# Patient Record
Sex: MALE | Race: WHITE | Employment: UNEMPLOYED | URBAN - METROPOLITAN AREA
[De-identification: names, ages, dates, MRNs, and addresses within clinical notes are randomized per-mention and may not be internally consistent; named-entity substitution may affect disease eponyms.]

---

## 2018-03-08 ENCOUNTER — HOSPITAL ENCOUNTER (OUTPATIENT)
Dept: GENERAL RADIOLOGY | Age: 58
Discharge: HOME | End: 2018-03-08
Attending: PHYSICIAN ASSISTANT
Payer: MEDICARE

## 2018-03-08 DIAGNOSIS — M54.2 CERVICALGIA: ICD-10-CM

## 2018-03-08 DIAGNOSIS — M47.816 LUMBAR SPONDYLOSIS: ICD-10-CM

## 2018-03-08 DIAGNOSIS — M51.17 SCIATIC RADICULITIS: ICD-10-CM

## 2018-03-08 PROCEDURE — 72052 X-RAY EXAM NECK SPINE 6/>VWS: CPT

## 2018-03-08 PROCEDURE — 72114 X-RAY EXAM L-S SPINE BENDING: CPT

## 2020-03-11 ENCOUNTER — OFFICE VISIT (OUTPATIENT)
Dept: NEUROLOGY | Age: 60
End: 2020-03-11

## 2020-03-11 VITALS
HEART RATE: 80 BPM | WEIGHT: 214 LBS | OXYGEN SATURATION: 98 % | HEIGHT: 68 IN | DIASTOLIC BLOOD PRESSURE: 82 MMHG | SYSTOLIC BLOOD PRESSURE: 130 MMHG | BODY MASS INDEX: 32.43 KG/M2

## 2020-03-11 DIAGNOSIS — H53.9 VISUAL DISTURBANCE: ICD-10-CM

## 2020-03-11 DIAGNOSIS — R20.2 PARESTHESIA: Primary | ICD-10-CM

## 2020-03-11 RX ORDER — GABAPENTIN 600 MG/1
600 TABLET ORAL
COMMUNITY

## 2020-03-11 RX ORDER — LOSARTAN POTASSIUM 50 MG/1
50 TABLET ORAL DAILY
COMMUNITY

## 2020-03-11 RX ORDER — HYDROCHLOROTHIAZIDE 25 MG/1
25 TABLET ORAL DAILY
COMMUNITY

## 2020-03-11 RX ORDER — RANITIDINE 300 MG/1
300 TABLET ORAL
Status: ON HOLD | COMMUNITY
End: 2020-09-29

## 2020-03-11 RX ORDER — PANTOPRAZOLE SODIUM 40 MG/1
40 TABLET, DELAYED RELEASE ORAL DAILY
Status: ON HOLD | COMMUNITY
End: 2020-09-29

## 2020-03-11 RX ORDER — ATORVASTATIN CALCIUM 20 MG/1
TABLET, FILM COATED ORAL DAILY
COMMUNITY

## 2020-03-11 NOTE — PROGRESS NOTES
Chief Complaint   Patient presents with    Leg Pain     \"leg pain and restlessness intermittently for years, numbness that is worsening in my arms\"     Visit Vitals  /82 (BP 1 Location: Right arm, BP Patient Position: Sitting)   Pulse 80   Ht 5' 8\" (1.727 m)   Wt 97.1 kg (214 lb)   SpO2 98%   BMI 32.54 kg/m²

## 2020-03-11 NOTE — PROGRESS NOTES
NEUROLOGY NEW PATIENT OFFICE CONSULTATION      3/11/2020    RE: Nathalie Astudillo         1960      REFERRED BY:  Meliton Dinero PA-C        CHIEF COMPLAINT:  This is Nathalie Astudillo is a 61 y.o. male right handed on disability for pain who had concerns including Leg Pain (\"leg pain and restlessness intermittently for years, numbness that is worsening in my arms\"). HPI:     For the past 20 yrs, patient will have episodes of visual disturbance described as \"squiggly lines\" at the periphery of both eyes, occurring 2/ month, lasting 2-3 mins. (+) glaucoma and had to \"drill a hole in the eyes\" 5 yrs ago. Numbness in both arms involving 3rd to 5th fingers, R worse than L   (+) chronic neck pain - non-radiating. Chest tightness. (+) insomnia - due to chronic pain    EMG/NCS at [facility] 3 yrs ago unknown results. ROS   (-) fever  (-) rash  All other systems reviewed and are negative    Past Medical Hx  Past Medical History:   Diagnosis Date    Acid reflux     Arthritis     Chronic pain     Depression     High cholesterol     Hypertension     Sleep disorder     Stroke ([facility] [address] 2010    ministroke   R knee pain  Hip pain  Chronic neck and lower back pain - seeing pain management s/p rhizotomy    Social Hx  Social History     Socioeconomic History    Marital status: UNKNOWN     Spouse name: Not on file    Number of children: Not on file    Years of education: Not on file    Highest education level: Not on file   Tobacco Use    Smoking status: Never Smoker    Smokeless tobacco: Never Used       Family Hx  No family history on file. ALLERGIES  Allergies   Allergen Reactions    Nsaids (Non-Steroidal Anti-Inflammatory Drug) Other (comments)       CURRENT MEDS  Current Outpatient Medications   Medication Sig Dispense Refill    gabapentin (NEURONTIN) 600 mg tablet Take 600 mg by mouth five (5) times daily.  losartan (COZAAR) 50 mg tablet Take 50 mg by mouth daily.       raNITIdine (ZANTAC) 300 mg tab Take 300 mg by mouth nightly.  atorvastatin (LIPITOR) 20 mg tablet Take  by mouth daily.  hydroCHLOROthiazide (HYDRODIURIL) 25 mg tablet Take 25 mg by mouth daily.  pantoprazole (PROTONIX) 40 mg tablet Take 40 mg by mouth daily.  hydrocodone-acetaminophen (NORCO) 5-325 mg per tablet Take 1 tablet by mouth every eight (8) hours as needed for Pain.  multivitamin (ONE A DAY) tablet Take 1 tablet by mouth daily.  trazodone (DESYREL) 100 mg tablet Take 100 mg by mouth nightly.  rosuvastatin (CRESTOR) 10 mg tablet Take 10 mg by mouth nightly.  tramadol (ULTRAM) 50 mg tablet Take 50 mg by mouth two (2) times daily as needed for Pain.  aspirin 81 mg chewable tablet Take 81 mg by mouth daily.  diclofenac-misoprostol (ARTHROTEC 75)  mg-mcg per tablet Take 1 tablet by mouth two (2) times a day.  candesartan (ATACAND) 16 mg tablet Take 16 mg by mouth daily.  esomeprazole (NEXIUM) 40 mg capsule Take 40 mg by mouth daily.  prednisone (STERAPRED DS) 10 mg dose pack Take as directed. May substitute 21 pills with tapering dose instructions. 21 tablet 0    cyclobenzaprine (FLEXERIL) 10 mg tablet Take 1 tablet by mouth three (3) times daily as needed for Muscle Spasm(s). 20 tablet 0           PREVIOUS WORKUP: (reviewed)  IMAGING:    CT Results (recent):  Results from Hospital Encounter encounter on 10/10/14   CT SPINE CERV WO CONT    Narrative **Final Report**       ICD Codes / Adm. Diagnosis: 225579  491294 / Shoulder Pain  Hypertension  Examination:  CT C SPINE WO CON  - [id number] - Oct 10 2014  5:52PM  Accession No:  [id number]  Reason:  trauma      REPORT:  INDICATION: trauma    Exam: Noncontrast CT of the cervical spine is performed with 2.5 mm   collimation. Sagittal and coronal reformatted images were also performed. FINDINGS: There is no acute fracture or subluxation. The prevertebral soft   tissues are within normal limits.  Bones are diffusely demineralized. There   are mild multilevel cervical degenerative changes. Remaining visualized soft   tissues are normal.        IMPRESSION: No acute fracture or subluxation. Signing/Reading Doctor: TODD B. Christen Kayser ([id number])    Approved: TODD B. Christen Kayser ([id number])  Oct 10 2014  6:16PM                                      MRI Results (recent):  No results found for this or any previous visit. IR Results (recent):  No results found for this or any previous visit. VAS/US Results (recent):  No results found for this or any previous visit. LABS (reviewed)  Results for orders placed or performed during the hospital encounter of 19/58/84   METABOLIC PANEL, BASIC   Result Value Ref Range    Sodium 141 136 - 145 mmol/L    Potassium 4.5 3.5 - 5.1 mmol/L    Chloride 105 97 - 108 mmol/L    CO2 27 21 - 32 mmol/L    Anion gap 9 5 - 15 mmol/L    Glucose 86 65 - 100 mg/dL    BUN 21 (H) 6 - 20 MG/DL    Creatinine 1.03 0.45 - 1.15 MG/DL    BUN/Creatinine ratio 20 12 - 20      GFR est AA >60 >60 ml/min/1.73m2    GFR est non-AA >60 >60 ml/min/1.73m2    Calcium 9.6 8.5 - 10.1 MG/DL   CBC WITH AUTOMATED DIFF   Result Value Ref Range    WBC 11.5 (H) 4.1 - 11.1 K/uL    RBC 4.86 4.10 - 5.70 M/uL    HGB 15.5 12.1 - 17.0 g/dL    HCT 43.8 36.6 - 50.3 %    MCV 90.1 80.0 - 99.0 FL    MCH 31.9 26.0 - 34.0 PG    MCHC 35.4 30.0 - 36.5 g/dL    RDW 13.7 11.5 - 14.5 %    PLATELET 088 428 - 304 K/uL    NEUTROPHILS 75 32 - 75 %    LYMPHOCYTES 18 12 - 49 %    MONOCYTES 6 5 - 13 %    EOSINOPHILS 1 0 - 7 %    BASOPHILS 0 0 - 1 %    ABS. NEUTROPHILS 8.7 (H) 1.8 - 8.0 K/UL    ABS. LYMPHOCYTES 2.0 0.8 - 3.5 K/UL    ABS. MONOCYTES 0.7 0.0 - 1.0 K/UL    ABS. EOSINOPHILS 0.1 0.0 - 0.4 K/UL    ABS.  BASOPHILS 0.0 0.0 - 0.1 K/UL   POC TROPONIN-I   Result Value Ref Range    Troponin-I (POC) <0.04 0.00 - 0.08 ng/mL   EKG, 12 LEAD, INITIAL   Result Value Ref Range    Ventricular Rate 88 BPM    Atrial Rate 88 BPM    P-R Interval 178 ms    QRS Duration 92 ms    Q-T Interval 352 ms    QTC Calculation (Bezet) 425 ms    Calculated P Axis 25 degrees    Calculated R Axis -17 degrees    Calculated T Axis 3 degrees    Diagnosis       Normal sinus rhythm with sinus arrhythmia  Voltage criteria for left ventricular hypertrophy  Abnormal ECG  No previous ECGs available  Confirmed by Chan Rome MD ([id number]) on 10/11/2014 9:14:57 PM       Physical Exam:     Visit Vitals  /82 (BP 1 Location: Right arm, BP Patient Position: Sitting)   Pulse 80   Ht 5' 8\" (1.727 m)   Wt 97.1 kg (214 lb)   SpO2 98%   BMI 32.54 kg/m²     General:  Alert, cooperative, no distress. Head:  Normocephalic, without obvious abnormality, atraumatic. Eyes:  Conjunctivae/corneas clear. Lungs:  Heart:   Non labored breathing  Regular rate and rhythm, no carotid bruits   Abdomen:   Soft, non-distended   Extremities: Extremities normal, atraumatic, no cyanosis or edema. Pulses: 2+ and symmetric all extremities. Skin: Skin color, texture, turgor normal. No rashes or lesions.   Neurologic Exam     Gen: Attention normal             Language: naming, repetition, fluency normal             Memory: intact recent and remote memory  Cranial Nerves:  I: smell Not tested   II: visual fields Full to confrontation   II: pupils Equal, round, reactive to light   II: optic disc No papilledema   III,VII: ptosis none   III,IV,VI: extraocular muscles  Full ROM   V: mastication normal   V: facial light touch sensation  normal   VII: facial muscle function   symmetric   VIII: hearing symmetric   IX: soft palate elevation  normal   XI: trapezius strength  5/5   XI: sternocleidomastoid strength 5/5   XI: neck flexion strength  5/5   XII: tongue  midline     Motor: normal bulk and tone, no tremor              Strength: 5/5 all four extremities  Sensory: intact to LT, PP, vibration, and JPS  Reflexes: 1+ throughout; Down going toes  Coordination: Good FTN and HTS  Gait: normal gait including tandem Impression:     Zulay Mix is a 61 y.o. male who  has a past medical history of Acid reflux, Arthritis, Chronic pain, Depression, High cholesterol, Hypertension, Sleep disorder, and Stroke ([facility] [address] (2010). who comes in with several concerns. For the past 20 yrs, patient will have episodes of visual disturbance described as \"squiggly lines\" at the periphery of both eyes, occurring 2/ month, lasting 2-3 mins. Considerations include retinal migraine and related to history of glaucoma (had to \"drill a hole in the eyes\" 5 yrs ago). Patient also has intermittent numbness in both arms involving 3rd to 5th fingers, R worse than L usually while he is sleeping. Differentials include carpal tunnel syndrome and cervical radiculopathy (chronic neck pain - non-radiating with history of degenerative disc disease). His insomnia due to chronic neck pain may be playing a role with his symptoms,    RECOMMENDATIONS  1. I had a long discussion with patient. Discussed diagnosis, prognosis, pathophysiology and available treatment. All questions were answered. 2. Offered doing MRI brain. Patient declined for now due to financial constraints. He did have a CT head in 2014 which was unremarkable  3. Discussed doing EMG/NCS of both UE. Patient also deferred for now  4. Advise to use bilateral wrist splint at night to see if this will help with paresthesia and thereby supporting the diagnosis of CTS  5. Advise to follow up with PCP if he continues to have chest pain  6. Recommend improving sleep hygiene  7. Already seeing pain management for chronic pain issues      Follow-up and Dispositions    · Return if symptoms worsen or fail to improve.             Thank you for the consultation      Dilip Romero MD  Diplomate, American Board of Psychiatry and Neurology  Diplomate, Neuromuscular Medicine  Diplomate, American Board of Electrodiagnostic Medicine        CC: Kelli Meraz  Fax: [phone]

## 2020-03-11 NOTE — LETTER
3/11/20 Patient: Brionna Mays YOB: 1960 Date of Visit: 3/11/2020 Caesar Rascon PA-C 
[address] [place] [address] VIA Facsimile: [phone] Dear Caesar Rascon PA-C, Thank you for referring Mr. Idania Lewis to [address] [place] [address] for evaluation. My notes for this consultation are attached. If you have questions, please do not hesitate to call me. I look forward to following your patient along with you. Sincerely, Anitha Ragsdale MD

## 2020-09-09 ENCOUNTER — HOSPITAL ENCOUNTER (OUTPATIENT)
Dept: MRI IMAGING | Age: 60
Discharge: HOME | End: 2020-09-09
Attending: PHYSICAL MEDICINE & REHABILITATION
Payer: MEDICARE

## 2020-09-09 DIAGNOSIS — M54.50 LOW BACK PAIN: ICD-10-CM

## 2020-09-09 DIAGNOSIS — M47.816 SPONDYLOSIS OF LUMBAR JOINT: ICD-10-CM

## 2020-09-09 DIAGNOSIS — Z79.891 CHRONICALLY ON OPIATE THERAPY: ICD-10-CM

## 2020-09-09 PROCEDURE — 72148 MRI LUMBAR SPINE W/O DYE: CPT

## 2020-09-25 ENCOUNTER — HOSPITAL ENCOUNTER (OUTPATIENT)
Dept: LAB | Age: 60
Discharge: HOME | End: 2020-09-25
Payer: MEDICARE

## 2020-09-25 DIAGNOSIS — Z01.812 PRE-PROCEDURAL LABORATORY EXAMINATIONS: ICD-10-CM

## 2020-09-25 PROCEDURE — 87635 SARS-COV-2 COVID-19 AMP PRB: CPT

## 2020-09-25 RX ORDER — FAMOTIDINE 40 MG/1
40 TABLET, FILM COATED ORAL DAILY
COMMUNITY

## 2020-09-25 RX ORDER — OXYCODONE AND ACETAMINOPHEN 10; 325 MG/1; MG/1
1 TABLET ORAL
COMMUNITY

## 2020-09-25 NOTE — PERIOP NOTES
[address] [place] [address] Dr Baxter Preprocedure Instructions      1. On the day of your surgery, please report to registration located on the 2nd floor of the  [facility]. yes    2. You must have a responsible adult to drive you to the hospital, stay at the hospital during your procedure and drive you home. If they leave your procedure will not be started (no exceptions). yes    3. Do not have anything to eat or drink (including water, gum, mints, coffee, and juice) after midnight. This does not apply to the medications you were instructed to take by your physician. yesIf you are currently taking Plavix, Coumadin, Aspirin, or other blood-thinning agents, contact your physician for special instructions. Yes- aspirin. 4. If you are having a procedure that requires bowel prep: We recommend that you have only clear liquids the day before your procedure and begin your bowel prep by 5:00 pm.  You may continue to drink clear liquids until midnight. If for any reason you are not able to complete your prep please notify your physician immediately. yes    5. Have a list of all current medications, including vitamins, herbal supplements and any other over the counter medications. yes    6. If you wear glasses, contacts, dentures and/or hearing aids, they may be removed prior to procedure, please bring a case to store them in. yes    7. You should understand that if you do not follow these instructions your procedure may be cancelled. If your physical condition changes (I.e. fever, cold or flu) please contact your doctor as soon as possible. 8. It is important that you be on time. If for any reason you are unable to keep your appointment please call [phone] the day of or your physicians office prior to your scheduled procedure.

## 2020-09-26 LAB — SARS-COV-2, COV2NT: NOT DETECTED

## 2020-09-29 ENCOUNTER — ANESTHESIA (OUTPATIENT)
Dept: ENDOSCOPY | Age: 60
End: 2020-09-29
Payer: MEDICARE

## 2020-09-29 ENCOUNTER — HOSPITAL ENCOUNTER (OUTPATIENT)
Age: 60
Setting detail: OUTPATIENT SURGERY
Discharge: HOME | End: 2020-09-29
Attending: INTERNAL MEDICINE | Admitting: INTERNAL MEDICINE
Payer: MEDICARE

## 2020-09-29 ENCOUNTER — ANESTHESIA EVENT (OUTPATIENT)
Dept: ENDOSCOPY | Age: 60
End: 2020-09-29
Payer: MEDICARE

## 2020-09-29 VITALS
HEART RATE: 86 BPM | RESPIRATION RATE: 16 BRPM | TEMPERATURE: 98.6 F | SYSTOLIC BLOOD PRESSURE: 128 MMHG | OXYGEN SATURATION: 94 % | BODY MASS INDEX: 32.61 KG/M2 | HEIGHT: 68 IN | DIASTOLIC BLOOD PRESSURE: 97 MMHG | WEIGHT: 215.17 LBS

## 2020-09-29 PROCEDURE — 76040000007: Performed by: INTERNAL MEDICINE

## 2020-09-29 PROCEDURE — 77030003657 HC NDL SCLER BSC -B: Performed by: INTERNAL MEDICINE

## 2020-09-29 PROCEDURE — 74011000250 HC RX REV CODE- 250: Performed by: NURSE ANESTHETIST, CERTIFIED REGISTERED

## 2020-09-29 PROCEDURE — 2709999900 HC NON-CHARGEABLE SUPPLY: Performed by: INTERNAL MEDICINE

## 2020-09-29 PROCEDURE — 74011250636 HC RX REV CODE- 250/636: Performed by: NURSE ANESTHETIST, CERTIFIED REGISTERED

## 2020-09-29 PROCEDURE — 77030021593 HC FCPS BIOP ENDOSC BSC -A: Performed by: INTERNAL MEDICINE

## 2020-09-29 PROCEDURE — 74011250636 HC RX REV CODE- 250/636: Performed by: INTERNAL MEDICINE

## 2020-09-29 PROCEDURE — 76060000032 HC ANESTHESIA 0.5 TO 1 HR: Performed by: INTERNAL MEDICINE

## 2020-09-29 PROCEDURE — 88305 TISSUE EXAM BY PATHOLOGIST: CPT

## 2020-09-29 RX ORDER — DEXTROMETHORPHAN/PSEUDOEPHED 2.5-7.5/.8
1.2 DROPS ORAL
Status: DISCONTINUED | OUTPATIENT
Start: 2020-09-29 | End: 2020-09-29 | Stop reason: HOSPADM

## 2020-09-29 RX ORDER — PROPOFOL 10 MG/ML
INJECTION, EMULSION INTRAVENOUS AS NEEDED
Status: DISCONTINUED | OUTPATIENT
Start: 2020-09-29 | End: 2020-09-29 | Stop reason: HOSPADM

## 2020-09-29 RX ORDER — SODIUM CHLORIDE 9 MG/ML
50 INJECTION, SOLUTION INTRAVENOUS CONTINUOUS
Status: DISCONTINUED | OUTPATIENT
Start: 2020-09-29 | End: 2020-09-29 | Stop reason: HOSPADM

## 2020-09-29 RX ORDER — LABETALOL HYDROCHLORIDE 5 MG/ML
INJECTION, SOLUTION INTRAVENOUS AS NEEDED
Status: DISCONTINUED | OUTPATIENT
Start: 2020-09-29 | End: 2020-09-29 | Stop reason: HOSPADM

## 2020-09-29 RX ORDER — NALOXONE HYDROCHLORIDE 0.4 MG/ML
0.4 INJECTION, SOLUTION INTRAMUSCULAR; INTRAVENOUS; SUBCUTANEOUS
Status: DISCONTINUED | OUTPATIENT
Start: 2020-09-29 | End: 2020-09-29 | Stop reason: HOSPADM

## 2020-09-29 RX ORDER — LIDOCAINE HYDROCHLORIDE 20 MG/ML
INJECTION, SOLUTION EPIDURAL; INFILTRATION; INTRACAUDAL; PERINEURAL AS NEEDED
Status: DISCONTINUED | OUTPATIENT
Start: 2020-09-29 | End: 2020-09-29 | Stop reason: HOSPADM

## 2020-09-29 RX ORDER — ATROPINE SULFATE 0.1 MG/ML
0.4 INJECTION INTRAVENOUS
Status: DISCONTINUED | OUTPATIENT
Start: 2020-09-29 | End: 2020-09-29 | Stop reason: HOSPADM

## 2020-09-29 RX ORDER — EPINEPHRINE 0.1 MG/ML
1 INJECTION INTRACARDIAC; INTRAVENOUS
Status: DISCONTINUED | OUTPATIENT
Start: 2020-09-29 | End: 2020-09-29 | Stop reason: HOSPADM

## 2020-09-29 RX ORDER — PROPOFOL 10 MG/ML
INJECTION, EMULSION INTRAVENOUS
Status: DISCONTINUED | OUTPATIENT
Start: 2020-09-29 | End: 2020-09-29 | Stop reason: HOSPADM

## 2020-09-29 RX ORDER — FLUMAZENIL 0.1 MG/ML
0.2 INJECTION INTRAVENOUS
Status: DISCONTINUED | OUTPATIENT
Start: 2020-09-29 | End: 2020-09-29 | Stop reason: HOSPADM

## 2020-09-29 RX ORDER — MIDAZOLAM HYDROCHLORIDE 1 MG/ML
.25-5 INJECTION, SOLUTION INTRAMUSCULAR; INTRAVENOUS
Status: DISCONTINUED | OUTPATIENT
Start: 2020-09-29 | End: 2020-09-29 | Stop reason: HOSPADM

## 2020-09-29 RX ADMIN — LIDOCAINE HYDROCHLORIDE 100 MG: 20 INJECTION, SOLUTION INTRAVENOUS at 10:11

## 2020-09-29 RX ADMIN — SODIUM CHLORIDE 50 ML/HR: 900 INJECTION, SOLUTION INTRAVENOUS at 09:51

## 2020-09-29 RX ADMIN — PROPOFOL 100 MCG/KG/MIN: 10 INJECTION, EMULSION INTRAVENOUS at 10:11

## 2020-09-29 RX ADMIN — PROPOFOL 100 MG: 10 INJECTION, EMULSION INTRAVENOUS at 10:11

## 2020-09-29 RX ADMIN — LABETALOL HYDROCHLORIDE 5 MG: 5 INJECTION INTRAVENOUS at 10:25

## 2020-09-29 RX ADMIN — PROPOFOL 80 MG: 10 INJECTION, EMULSION INTRAVENOUS at 10:12

## 2020-09-29 NOTE — PERIOP NOTES
Received Report From Lakesha Youngblood CRNA @ 6413, see anesthesia notes. Care of the patient transferred to procedure nurse DianaRN @ 1050    Out of Procedure and sent to post-recovery @ 1050    Post-recovery report given to Babita Rosado RN @ 7979    Patient ABD remains soft and non-tender post procedure. Pt has no complaints at this time and tolerated the procedure well. Endoscope was pre-cleaned at bedside immediately following procedure by Delroy Canchola.

## 2020-09-29 NOTE — DISCHARGE INSTRUCTIONS
[facility]. Tiffanie Sorto M.D.  [phone]                 COLON and EGD DISCHARGE INSTRUCTIONS    2020    Alyssa Lacy  :  1960  [place] Medical Record Number:  [id number]      DISCOMFORT:  Sore throat- throat lozenges or warm salt water gargle  Redness at IV site- apply warm compress to area; if redness or soreness persist- contact your physician  There may be a slight amount of blood passed from the rectum  Gaseous discomfort- walking, belching will help relieve any discomfort  You may not operate a vehicle for 12 hours  You may not engage in an occupation involving machinery or appliances for rest of today  You may not drink alcoholic beverages for at least 12 hours  Avoid making any critical decisions for at least 24 hour  DIET:   High fiber diet. - however -  remember your colon is empty and a heavy meal will produce gas. Avoid these foods:  vegetables, fried / greasy foods, carbonated drinks for today     ACTIVITY:  You may  resume your normal daily activities it is recommended that you spend the remainder of the day resting -  avoid any strenuous activity and driving. CALL M.D. ANY SIGN OF:   Increasing pain, nausea, vomiting  Abdominal distension (swelling)  New increased bleeding (oral or rectal)  Fever (chills)  Pain in chest area  Bloody discharge from nose or mouth  Shortness of breath      Follow-up Instructions:   Call Dr. Felix Phillips if any questions at [phone]. Results of procedure / biopsy in 7 to 10 days, we will call you with these results. Your endoscopy showed normal   Your colonoscopy showed large polyp noted.  This will need to be removed

## 2020-09-29 NOTE — ANESTHESIA POSTPROCEDURE EVALUATION
Procedure(s):  COLONOSCOPY  ESOPHAGOGASTRODUODENOSCOPY (EGD)  ESOPHAGOGASTRODUODENAL (EGD) BIOPSY  INJECTION W/BLACK EYE DYE. MAC    Anesthesia Post Evaluation      Multimodal analgesia: multimodal analgesia not used between 6 hours prior to anesthesia start to PACU discharge  Patient location during evaluation: PACU  Patient participation: complete - patient participated  Level of consciousness: awake  Pain management: adequate  Airway patency: patent  Anesthetic complications: no  Cardiovascular status: acceptable, blood pressure returned to baseline and hemodynamically stable  Respiratory status: acceptable  Hydration status: acceptable  Post anesthesia nausea and vomiting:  controlled  Final Post Anesthesia Temperature Assessment:  Normothermia (36.0-37.5 degrees C)      INITIAL Post-op Vital signs:   Vitals Value Taken Time   /97 9/29/2020 11:16 AM   Temp 37 °C (98.6 °F) 9/29/2020 11:01 AM   Pulse 84 9/29/2020 11:21 AM   Resp 16 9/29/2020 11:21 AM   SpO2 93 % 9/29/2020 11:21 AM   Vitals shown include unvalidated device data.

## 2020-09-29 NOTE — PROCEDURES
Kb Chase M.D.  [phone]           2020                EGD Operative Report  Carla Valdez  :  1960  [address] [place] [address] Medical Record Number:  [id number]      Indication: early satiety     : Natan Dela Cruz MD    Assistant -- None  Implants -- None    Referring Provider:  Te Sanderson MD      Anesthesia/Sedation:  MAC anesthesia Propofol    Airway assessment: No airway problems anticipated    Pre-Procedural Exam:      Airway: clear, no airway problems anticipated  Heart: RRR, without gallops or rubs  Lungs: clear bilaterally without wheezes, crackles, or rhonchi  Abdomen: soft, nontender, nondistended, bowel sounds present  Mental Status: awake, alert and oriented to person, place and time       Procedure Details     After infomed consent was obtained for the procedure, with all risks and benefits of procedure explained the patient was taken to the endoscopy suite and placed in the left lateral decubitus position. Following sequential administration of sedation as per above, the endoscope was inserted into the mouth and advanced under direct vision to second portion of the duodenum. A careful inspection was made as the gastroscope was withdrawn, including a retroflexed view of the proximal stomach; findings and interventions are described below. Findings:   Esophagus:normal  Stomach: normal   Duodenum/jejunum: normal    Therapies:  biopsy of stomach - r/o H.pylori    Specimens: as above           Complications:   None; patient tolerated the procedure well. EBL:  None.            Impression:    Normal EGD    Recommendations:    -Await pathology.  -Proceed with colonoscopy today as planned    Natan Dela Cruz MD

## 2020-09-29 NOTE — ROUTINE PROCESS
Savannah Zayda 1960 
[id number] Situation: 
Verbal report received from: Marty Mendez Procedure: Procedure(s): 
COLONOSCOPY 
ESOPHAGOGASTRODUODENOSCOPY (EGD) ESOPHAGOGASTRODUODENAL (EGD) BIOPSY INJECTION W/BLACK EYE DYE Background: 
 
Preoperative diagnosis: EARLY SATIEY, CHANGE IN BOWEL Postoperative diagnosis: diverticulosis Ascending colon polyp :  Dr. Josh Knowles Assistant(s): Endoscopy Technician-1: Lul Ellis Endoscopy RN-1: Marissa Lopez RN Specimens:  
ID Type Source Tests Collected by Time Destination 1 : gastric biopsy Preservative Gastric  Alvaro Angela MD 9/29/2020 1017 Pathology H. Pylori  no Assessment: 
Intra-procedure medications Anesthesia gave intra-procedure sedation and medications, see anesthesia flow sheet yes Intravenous fluids: NS@ [facility] Vital signs stable Abdominal assessment: round and soft Recommendation: 
Discharge patient per MD order. James Villalba Permission to share finding with family or friend yes Dr. Josh Knowles discussed with friend procedure findings and next steps.

## 2020-09-29 NOTE — PERIOP NOTES
Endoscopy discharge instructions have been reviewed and given to patient. The patient verbalized understanding and acceptance of instructions. Dr. Travon May discussed with patient procedure findings and next steps.

## 2020-09-29 NOTE — ANESTHESIA PREPROCEDURE EVALUATION
Relevant Problems   No relevant active problems       Anesthetic History   No history of anesthetic complications            Review of Systems / Medical History  Patient summary reviewed, nursing notes reviewed and pertinent labs reviewed    Pulmonary  Within defined limits                 Neuro/Psych         TIA     Cardiovascular    Hypertension              Exercise tolerance: >4 METS     GI/Hepatic/Renal  Within defined limits              Endo/Other        Arthritis     Other Findings              Physical Exam    Airway  Mallampati: II    Neck ROM: normal range of motion   Mouth opening: Normal     Cardiovascular  Regular rate and rhythm,  S1 and S2 normal,  no murmur, click, rub, or gallop  Rhythm: regular  Rate: normal         Dental  No notable dental hx       Pulmonary  Breath sounds clear to auscultation               Abdominal  GI exam deferred       Other Findings            Anesthetic Plan    ASA: 2  Anesthesia type: MAC          Induction: Intravenous  Anesthetic plan and risks discussed with: Patient

## 2020-09-29 NOTE — H&P
Teofilo Bird M.D.  [phone]            History and Physical       NAME:  Hood Chung   :   1960   MRN:   [id number]       Referring Physician:  Radha Nicholson MD      Consult Date: 2020 9:38 AM    Chief Complaint:  Change in bowel habits and early satiety    History of Present Illness:  Patient is a 61 y.o. who is seen for Change in bowel habits and early satiety. Denies any ongoing GI complaints. PMH:  Past Medical History:   Diagnosis Date    Acid reflux     Arthritis     Chronic pain     Depression     High cholesterol     Hypertension     Sleep disorder     Stroke ([facility] [address]     ministroke       PSH:  Past Surgical History:   Procedure Laterality Date    HX HERNIA REPAIR      x2    HX ORTHOPAEDIC Left     hip surgery    HX ORTHOPAEDIC Right     femur surgery    HX ORTHOPAEDIC Bilateral     knee surgery    HX ORTHOPAEDIC Right     knee replacement surgery    HX ORTHOPAEDIC Bilateral     wrist surgery       Allergies: Allergies   Allergen Reactions    Nsaids (Non-Steroidal Anti-Inflammatory Drug) Other (comments)       Home Medications:  Prior to Admission Medications   Prescriptions Last Dose Informant Patient Reported? Taking?   aspirin 81 mg chewable tablet 2020 at Unknown time  Yes Yes   Sig: Take 81 mg by mouth daily. atorvastatin (LIPITOR) 20 mg tablet 2020 at Unknown time  Yes Yes   Sig: Take  by mouth daily. cyclobenzaprine (FLEXERIL) 10 mg tablet 2020 at Unknown time  No Yes   Sig: Take 1 tablet by mouth three (3) times daily as needed for Muscle Spasm(s). famotidine (PEPCID) 40 mg tablet 2020 at Unknown time  Yes Yes   Sig: Take 40 mg by mouth daily. gabapentin (NEURONTIN) 600 mg tablet 2020 at Unknown time  Yes Yes   Sig: Take 600 mg by mouth five (5) times daily. hydroCHLOROthiazide (HYDRODIURIL) 25 mg tablet 2020 at Unknown time  Yes Yes   Sig: Take 25 mg by mouth daily. losartan (COZAAR) 50 mg tablet 9/29/2020 at Unknown time  Yes Yes   Sig: Take 50 mg by mouth daily. oxyCODONE-acetaminophen (PERCOCET 10)  mg per tablet 9/28/2020 at Unknown time  Yes Yes   Sig: Take 1 Tab by mouth every six (6) hours as needed for Pain. Facility-Administered Medications: None       Hospital Medications:  No current facility-administered medications for this encounter. Social History:  Social History     Tobacco Use    Smoking status: Never Smoker    Smokeless tobacco: Never Used   Substance Use Topics    Alcohol use: Never     Frequency: Never       Family History:  History reviewed. No pertinent family history. Review of Systems:      Constitutional: negative fever, negative chills, negative weight loss  Eyes:   negative visual changes  ENT:   negative sore throat, tongue or lip swelling  Respiratory:  negative cough, negative dyspnea  Cards:  negative for chest pain, palpitations, lower extremity edema  GI:   See HPI  :  negative for frequency, dysuria  Integument:  negative for rash and pruritus  Heme:  negative for easy bruising and gum/nose bleeding  Musculoskel: negative for myalgias,  back pain and muscle weakness  Neuro: negative for headaches, dizziness, vertigo  Psych:  negative for feelings of anxiety, depression       Objective:   No data found. No intake/output data recorded. No intake/output data recorded. EXAM:     NEURO-a&o   HEENT-wnl   LUNGS-clear    COR-regular rate and rhythym     ABD-soft , no tenderness, no rebound, good bs     EXT-no edema     Data Review     No results for input(s): WBC, HGB, HCT, PLT, HGBEXT, HCTEXT, PLTEXT in the last 72 hours. No results for input(s): NA, K, CL, CO2, BUN, CREA, GLU, PHOS, CA in the last 72 hours. No results for input(s): AP, TBIL, TP, ALB, GLOB, GGT, AML, LPSE in the last 72 hours.     No lab exists for component: SGOT, GPT, AMYP, HLPSE  No results for input(s): INR, PTP, APTT, INREXT in the last 72 hours. There is no problem list on file for this patient. Assessment:   · Change in bowel habits  · Early satiety   Plan:   · EGD  · Colonoscopy today.      Signed By: Abraham Villaseñor MD     9/29/2020  9:38 AM

## 2020-09-29 NOTE — PROCEDURES
Mike Santamaria M.D.  [phone]            2020          Colonoscopy Operative Report  Jay Souza  :  1960  [facility] Medical Record Number:  [id number]      Indications:  change in bowel habits     :  Jaun Perdomo MD    Assistant -- None  Implants -- None    Referring Provider: Fredrick Lawson MD    Sedation:  MAC anesthesia Propofol    Pre-Procedural Exam:      Airway: clear,  No airway problems anticipated  Heart: RRR, without gallops or rubs  Lungs: clear bilaterally without wheezes, crackles, or rhonchi  Abdomen: soft, nontender, nondistended, bowel sounds present  Mental Status: awake, alert and oriented to person, place and time     Procedure Details:  After informed consent was obtained with all risks and benefits of procedure explained and preoperative exam completed, the patient was taken to the endoscopy suite and placed in the left lateral decubitus position. Upon sequential sedation as per above, a digital rectal exam was performed. The Olympus videocolonoscope  was inserted in the rectum and carefully advanced to the cecum, which was identified by the ileocecal valve and appendiceal orifice. The quality of preparation was good. The colonoscope was slowly withdrawn with careful inspection and evaluation between folds. Retroflexion in the rectum was performed. Findings:     Cecum: normal  Ascending Colon: 1  Pedunculated polyp(s), the largest 30-40 mm in size;  Transverse Colon: normal  Descending Colon:     - Diverticulosis  Sigmoid:     - Diverticulosis  Rectum: normal    Interventions:  injection of 3 cc of tattoo    Specimen Removed:  none    Complications: None. EBL:  None. Impression:  Moderate diverticulosis noted in the left colon                         Large ascending colon polyp noted with a flat base.  This will need an EMR and was not removed    Recommendations:  -Repeat colonoscopy to be set up soon for removal of the large polyp   -High fiber diet.    -Resume normal medication(s). Discharge Disposition:  Home in the company of a  when able to ambulate.     Gaby Morris MD  9/29/2020  10:50 AM

## 2020-10-07 ENCOUNTER — HOSPITAL ENCOUNTER (EMERGENCY)
Age: 60
Discharge: HOME | End: 2020-10-08
Attending: STUDENT IN AN ORGANIZED HEALTH CARE EDUCATION/TRAINING PROGRAM
Payer: MEDICARE

## 2020-10-07 ENCOUNTER — APPOINTMENT (OUTPATIENT)
Dept: CT IMAGING | Age: 60
End: 2020-10-07
Attending: STUDENT IN AN ORGANIZED HEALTH CARE EDUCATION/TRAINING PROGRAM
Payer: MEDICARE

## 2020-10-07 DIAGNOSIS — Z98.890 STATUS POST COLONOSCOPY: Primary | ICD-10-CM

## 2020-10-07 DIAGNOSIS — K59.00 CONSTIPATION, UNSPECIFIED CONSTIPATION TYPE: ICD-10-CM

## 2020-10-07 DIAGNOSIS — R14.0 ABDOMINAL DISTENSION: ICD-10-CM

## 2020-10-07 DIAGNOSIS — R91.8 LUNG MASS: ICD-10-CM

## 2020-10-07 LAB
ALBUMIN SERPL-MCNC: 3.6 G/DL (ref 3.5–5)
ALBUMIN/GLOB SERPL: 0.9 {RATIO} (ref 1.1–2.2)
ALP SERPL-CCNC: 82 U/L (ref 45–117)
ALT SERPL-CCNC: 30 U/L (ref 12–78)
ANION GAP SERPL CALC-SCNC: 8 MMOL/L (ref 5–15)
AST SERPL-CCNC: 20 U/L (ref 15–37)
BASOPHILS # BLD: 0 K/UL (ref 0–0.1)
BASOPHILS NFR BLD: 1 % (ref 0–1)
BILIRUB SERPL-MCNC: 0.4 MG/DL (ref 0.2–1)
BUN SERPL-MCNC: 15 MG/DL (ref 6–20)
BUN/CREAT SERPL: 10 (ref 12–20)
CALCIUM SERPL-MCNC: 9.1 MG/DL (ref 8.5–10.1)
CHLORIDE SERPL-SCNC: 108 MMOL/L (ref 97–108)
CO2 SERPL-SCNC: 26 MMOL/L (ref 21–32)
COMMENT, HOLDF: NORMAL
CREAT SERPL-MCNC: 1.43 MG/DL (ref 0.7–1.3)
DIFFERENTIAL METHOD BLD: NORMAL
EOSINOPHIL # BLD: 0.2 K/UL (ref 0–0.4)
EOSINOPHIL NFR BLD: 2 % (ref 0–7)
ERYTHROCYTE [DISTWIDTH] IN BLOOD BY AUTOMATED COUNT: 13 % (ref 11.5–14.5)
GLOBULIN SER CALC-MCNC: 4 G/DL (ref 2–4)
GLUCOSE SERPL-MCNC: 119 MG/DL (ref 65–100)
HCT VFR BLD AUTO: 44 % (ref 36.6–50.3)
HGB BLD-MCNC: 15.6 G/DL (ref 12.1–17)
IMM GRANULOCYTES # BLD AUTO: 0 K/UL (ref 0–0.04)
IMM GRANULOCYTES NFR BLD AUTO: 0 % (ref 0–0.5)
INR PPP: 0.9 (ref 0.9–1.1)
LIPASE SERPL-CCNC: 147 U/L (ref 73–393)
LYMPHOCYTES # BLD: 2.9 K/UL (ref 0.8–3.5)
LYMPHOCYTES NFR BLD: 35 % (ref 12–49)
MCH RBC QN AUTO: 31.4 PG (ref 26–34)
MCHC RBC AUTO-ENTMCNC: 35.5 G/DL (ref 30–36.5)
MCV RBC AUTO: 88.5 FL (ref 80–99)
MONOCYTES # BLD: 0.7 K/UL (ref 0–1)
MONOCYTES NFR BLD: 8 % (ref 5–13)
NEUTS SEG # BLD: 4.5 K/UL (ref 1.8–8)
NEUTS SEG NFR BLD: 54 % (ref 32–75)
NRBC # BLD: 0 K/UL (ref 0–0.01)
NRBC BLD-RTO: 0 PER 100 WBC
PLATELET # BLD AUTO: 274 K/UL (ref 150–400)
PMV BLD AUTO: 10.8 FL (ref 8.9–12.9)
POTASSIUM SERPL-SCNC: 3.5 MMOL/L (ref 3.5–5.1)
PROT SERPL-MCNC: 7.6 G/DL (ref 6.4–8.2)
PROTHROMBIN TIME: 9.8 SEC (ref 9–11.1)
RBC # BLD AUTO: 4.97 M/UL (ref 4.1–5.7)
SAMPLES BEING HELD,HOLD: NORMAL
SODIUM SERPL-SCNC: 142 MMOL/L (ref 136–145)
WBC # BLD AUTO: 8.3 K/UL (ref 4.1–11.1)

## 2020-10-07 PROCEDURE — 83690 ASSAY OF LIPASE: CPT

## 2020-10-07 PROCEDURE — 80053 COMPREHEN METABOLIC PANEL: CPT

## 2020-10-07 PROCEDURE — 85610 PROTHROMBIN TIME: CPT

## 2020-10-07 PROCEDURE — 74177 CT ABD & PELVIS W/CONTRAST: CPT

## 2020-10-07 PROCEDURE — 74011000636 HC RX REV CODE- 636: Performed by: RADIOLOGY

## 2020-10-07 PROCEDURE — 85025 COMPLETE CBC W/AUTO DIFF WBC: CPT

## 2020-10-07 PROCEDURE — 74011250636 HC RX REV CODE- 250/636: Performed by: STUDENT IN AN ORGANIZED HEALTH CARE EDUCATION/TRAINING PROGRAM

## 2020-10-07 PROCEDURE — 36415 COLL VENOUS BLD VENIPUNCTURE: CPT

## 2020-10-07 PROCEDURE — 99283 EMERGENCY DEPT VISIT LOW MDM: CPT

## 2020-10-07 RX ADMIN — IOPAMIDOL 100 ML: 755 INJECTION, SOLUTION INTRAVENOUS at 22:54

## 2020-10-07 RX ADMIN — SODIUM CHLORIDE 1000 ML: 900 INJECTION, SOLUTION INTRAVENOUS at 22:02

## 2020-10-08 VITALS
SYSTOLIC BLOOD PRESSURE: 128 MMHG | OXYGEN SATURATION: 93 % | HEART RATE: 82 BPM | TEMPERATURE: 98 F | WEIGHT: 216.05 LBS | DIASTOLIC BLOOD PRESSURE: 90 MMHG | RESPIRATION RATE: 16 BRPM | HEIGHT: 68 IN | BODY MASS INDEX: 32.74 KG/M2

## 2020-10-08 NOTE — ED PROVIDER NOTES
70-year-old gentleman presenting with abdominal bloating and constipation, abdominal cramping since colonoscopy 1 week ago. Denies fevers, chills, emesis. States that after his colonoscopy to have a bowel movement for several days, he has been taking daily laxatives and yesterday had frequent episodes of soft nonbloody stool. Today he has not had a bowel movement. He has been having worsening abdominal cramping of blood several days. He called his gastroenterologist who recommended that he come to the emergency department to evaluate for possible perforation/microperforation. Past Medical History:   Diagnosis Date    Acid reflux     Arthritis     Chronic pain     Depression     High cholesterol     Hypertension     Sleep disorder     Stroke ([facility] [address] 2010    ministroke       Past Surgical History:   Procedure Laterality Date    COLONOSCOPY N/A 9/29/2020    COLONOSCOPY performed by Adair Moraes MD at [address] [place] [address] HX HERNIA REPAIR      x2    HX ORTHOPAEDIC Left     hip surgery    HX ORTHOPAEDIC Right     femur surgery    HX ORTHOPAEDIC Bilateral     knee surgery    HX ORTHOPAEDIC Right     knee replacement surgery    HX ORTHOPAEDIC Bilateral     wrist surgery         No family history on file.     Social History     Socioeconomic History    Marital status: SINGLE     Spouse name: Not on file    Number of children: Not on file    Years of education: Not on file    Highest education level: Not on file   Occupational History    Not on file   Social Needs    Financial resource strain: Not on file    Food insecurity     Worry: Not on file     Inability: Not on file    Transportation needs     Medical: Not on file     Non-medical: Not on file   Tobacco Use    Smoking status: Never Smoker    Smokeless tobacco: Never Used   Substance and Sexual Activity    Alcohol use: Never     Frequency: Never    Drug use: Never    Sexual activity: Not on file   Lifestyle    Physical activity Days per week: Not on file     Minutes per session: Not on file    Stress: Not on file   Relationships    Social connections     Talks on phone: Not on file     Gets together: Not on file     Attends Bahai service: Not on file     Active member of club or organization: Not on file     Attends meetings of clubs or organizations: Not on file     Relationship status: Not on file    Intimate partner violence     Fear of current or ex partner: Not on file     Emotionally abused: Not on file     Physically abused: Not on file     Forced sexual activity: Not on file   Other Topics Concern    Not on file   Social History Narrative    Not on file         ALLERGIES: Nsaids (non-steroidal anti-inflammatory drug)    Review of Systems   Constitutional: Negative for chills, fatigue and fever. HENT: Negative for ear pain, sore throat and trouble swallowing. Eyes: Negative for visual disturbance. Respiratory: Negative for cough and shortness of breath. Cardiovascular: Negative for chest pain. Gastrointestinal: Positive for abdominal distention, abdominal pain and constipation. Genitourinary: Negative for dysuria. Musculoskeletal: Negative for back pain. Skin: Negative for rash. Neurological: Negative for light-headedness and headaches. Psychiatric/Behavioral: Negative for confusion. All other systems reviewed and are negative. Vitals:    10/07/20 2119   BP: (!) 143/107   Pulse: (!) 106   Resp: 17   Temp: 98.7 °F (37.1 °C)   SpO2: 95%   Weight: 98 kg (216 lb 0.8 oz)   Height: 5' 8\" (1.727 m)            Physical Exam  Vitals signs reviewed. Constitutional:       General: He is not in acute distress. HENT:      Head: Normocephalic and atraumatic. Mouth/Throat:      Mouth: Mucous membranes are moist.      Pharynx: Oropharynx is clear. Cardiovascular:      Rate and Rhythm: Normal rate and regular rhythm. Heart sounds: Normal heart sounds.    Pulmonary:      Effort: Pulmonary effort is normal.      Breath sounds: Normal breath sounds. Abdominal:      General: There is distension. Tenderness: There is abdominal tenderness ( Diffuse tenderness, mild). There is no guarding or rebound. Musculoskeletal: Normal range of motion. Skin:     General: Skin is warm and dry. Capillary Refill: Capillary refill takes less than 2 seconds. Neurological:      General: No focal deficit present. Mental Status: He is alert and oriented to person, place, and time. Psychiatric:         Mood and Affect: Mood normal.          MDM  Number of Diagnoses or Management Options  Abdominal distension:   Constipation, unspecified constipation type:   Status post colonoscopy:   Stephanie Boyd is a 61 y.o. male presenting with abdominal pain post colonoscopy. Differential includes perforation, perforation, ileus, constipation, SBO, bowel obstruction (large), volvulus. Course: IV fluids, CT, re-eval, consultation with GI. Dispo: CT negative for acute surgical pathology, GI recommends bowel regimen, patient was informed of this. Also patient was informed that he has a lung mass that will need to have follow-up. .       Incidental findings on radiologic imaging discussed with patient. Copy of radiologist report provided to patient with instructions to follow up with their PCP within the next 2 weeks to further discuss findings and appropriate additional evaluation as needed.      Procedures

## 2020-10-08 NOTE — DISCHARGE INSTRUCTIONS
Please call your gastroenterologist tomorrow morning to establish further plan to deal with your abdominal bloating and distention. Your gastroenterologist recommended taking MiraLAX in order to help evacuate your stool. There was incidental mention of a lesion at the base of the left lung which will need a repeat x-ray or CT scan to make sure that it is not persistent. Please contact your primary care physician to set up this study as an outpatient.

## 2020-10-08 NOTE — ED TRIAGE NOTES
Pt.  had colonoscopy/endoscopy about 10 days ago, states is having increased abd pain and bloating, was sent in by GI doc. \[facility]\"" has a CT schedule for 10/19. States did find a polyp on during exam but was not able to be removed. States not passing gas and last BM was this AM but was small. Has been taking miralax.

## 2020-10-13 ENCOUNTER — TELEPHONE (OUTPATIENT)
Dept: NEUROLOGY | Age: 60
End: 2020-10-13

## 2020-10-13 NOTE — TELEPHONE ENCOUNTER
----- Message from Jacob Garvey sent at 10/13/2020  2:20 PM EDT -----  Regarding: MD Douglas/ telephone  Patient's first and last name: Alan Gibson  : 1960    Caller's first and last name: Vanessa You Pharamcy    Reason for call: Medication list    Callback required yes/no and why: yes     Best contact number(s): [phone]    Details to clarify the request:  Orville Delaney is requesting medication list for pt to be fax [phone]. Pt is changing pharmacy for routine prescriptions.

## 2020-11-02 ENCOUNTER — TRANSCRIBE ORDER (OUTPATIENT)
Dept: SCHEDULING | Age: 60
End: 2020-11-02

## 2020-11-02 DIAGNOSIS — R91.1 LUNG NODULE: Primary | ICD-10-CM

## 2020-11-20 ENCOUNTER — TRANSCRIBE ORDER (OUTPATIENT)
Dept: SCHEDULING | Age: 60
End: 2020-11-20

## 2020-12-04 ENCOUNTER — HOSPITAL ENCOUNTER (OUTPATIENT)
Dept: PET IMAGING | Age: 60
Discharge: HOME | End: 2020-12-04
Attending: INTERNAL MEDICINE
Payer: MEDICARE

## 2020-12-04 VITALS — BODY MASS INDEX: 32.89 KG/M2 | HEIGHT: 68 IN | WEIGHT: 217 LBS

## 2020-12-04 DIAGNOSIS — R91.1 LUNG NODULE: ICD-10-CM

## 2020-12-04 LAB
GLUCOSE BLD STRIP.AUTO-MCNC: 103 MG/DL (ref 65–100)
SERVICE CMNT-IMP: ABNORMAL

## 2020-12-04 PROCEDURE — A9552 F18 FDG: HCPCS

## 2020-12-04 RX ORDER — SODIUM CHLORIDE 0.9 % (FLUSH) 0.9 %
10 SYRINGE (ML) INJECTION
Status: COMPLETED | OUTPATIENT
Start: 2020-12-04 | End: 2020-12-04

## 2020-12-04 RX ADMIN — Medication 10 ML: at 13:28

## 2020-12-09 ENCOUNTER — TRANSCRIBE ORDER (OUTPATIENT)
Dept: SCHEDULING | Age: 60
End: 2020-12-09

## 2020-12-09 DIAGNOSIS — R91.1 LUNG NODULE: Primary | ICD-10-CM

## 2021-03-08 ENCOUNTER — HOSPITAL ENCOUNTER (OUTPATIENT)
Dept: CT IMAGING | Age: 61
Discharge: HOME | End: 2021-03-08
Attending: INTERNAL MEDICINE
Payer: MEDICARE

## 2021-03-08 DIAGNOSIS — R91.1 LUNG NODULE: ICD-10-CM

## 2021-03-08 PROCEDURE — 71250 CT THORAX DX C-: CPT

## 2021-03-11 ENCOUNTER — TRANSCRIBE ORDER (OUTPATIENT)
Dept: SCHEDULING | Age: 61
End: 2021-03-11

## 2021-03-11 DIAGNOSIS — R91.1 LUNG NODULE: Primary | ICD-10-CM

## 2021-05-18 ENCOUNTER — TRANSCRIBE ORDER (OUTPATIENT)
Dept: SCHEDULING | Age: 61
End: 2021-05-18

## 2021-05-18 DIAGNOSIS — M54.12 CERVICAL RADICULOPATHY: Primary | ICD-10-CM

## 2021-06-18 ENCOUNTER — HOSPITAL ENCOUNTER (OUTPATIENT)
Dept: MRI IMAGING | Age: 61
Discharge: HOME | End: 2021-06-18
Attending: ORTHOPAEDIC SURGERY
Payer: MEDICARE

## 2021-06-18 DIAGNOSIS — M54.12 CERVICAL RADICULOPATHY: ICD-10-CM

## 2021-06-18 PROCEDURE — 72141 MRI NECK SPINE W/O DYE: CPT

## 2023-04-23 DIAGNOSIS — M51.26 OTHER INTERVERTEBRAL DISC DISPLACEMENT, LUMBAR REGION: Primary | ICD-10-CM

## 2023-04-27 ENCOUNTER — HOSPITAL ENCOUNTER (OUTPATIENT)
Dept: MRI IMAGING | Age: 63
End: 2023-04-27
Attending: PHYSICAL MEDICINE & REHABILITATION
Payer: MEDICARE

## 2023-04-27 DIAGNOSIS — M51.26 OTHER INTERVERTEBRAL DISC DISPLACEMENT, LUMBAR REGION: ICD-10-CM

## 2023-04-27 PROCEDURE — 72148 MRI LUMBAR SPINE W/O DYE: CPT

## 2023-12-04 RX ORDER — ATORVASTATIN CALCIUM 40 MG/1
40 TABLET, FILM COATED ORAL DAILY
COMMUNITY

## 2023-12-04 RX ORDER — PRAMIPEXOLE DIHYDROCHLORIDE 0.12 MG/1
0.12 TABLET ORAL 3 TIMES DAILY
COMMUNITY

## 2023-12-04 RX ORDER — MULTIVIT WITH MINERALS/LUTEIN
1000 TABLET ORAL DAILY
COMMUNITY

## 2023-12-04 RX ORDER — CYCLOBENZAPRINE HCL 10 MG
10 TABLET ORAL 3 TIMES DAILY PRN
COMMUNITY
Start: 2014-10-10

## 2023-12-04 RX ORDER — OXYCODONE AND ACETAMINOPHEN 10; 325 MG/1; MG/1
1 TABLET ORAL EVERY 6 HOURS
COMMUNITY

## 2023-12-04 RX ORDER — GABAPENTIN 600 MG/1
600 TABLET ORAL 4 TIMES DAILY PRN
COMMUNITY

## 2023-12-04 RX ORDER — LOSARTAN POTASSIUM 50 MG/1
50 TABLET ORAL DAILY
COMMUNITY

## 2023-12-05 ENCOUNTER — ANESTHESIA (OUTPATIENT)
Facility: HOSPITAL | Age: 63
End: 2023-12-05
Payer: MEDICARE

## 2023-12-05 ENCOUNTER — ANESTHESIA EVENT (OUTPATIENT)
Facility: HOSPITAL | Age: 63
End: 2023-12-05
Payer: MEDICARE

## 2023-12-05 ENCOUNTER — HOSPITAL ENCOUNTER (OUTPATIENT)
Facility: HOSPITAL | Age: 63
Setting detail: OUTPATIENT SURGERY
Discharge: HOME | End: 2023-12-05
Attending: INTERNAL MEDICINE | Admitting: INTERNAL MEDICINE
Payer: MEDICARE

## 2023-12-05 VITALS
HEART RATE: 87 BPM | DIASTOLIC BLOOD PRESSURE: 67 MMHG | OXYGEN SATURATION: 96 % | TEMPERATURE: 98.4 F | WEIGHT: 210.1 LBS | BODY MASS INDEX: 31.12 KG/M2 | HEIGHT: 69 IN | SYSTOLIC BLOOD PRESSURE: 125 MMHG | RESPIRATION RATE: 20 BRPM

## 2023-12-05 PROCEDURE — 7100000010 HC PHASE II RECOVERY - FIRST 15 MIN: Performed by: INTERNAL MEDICINE

## 2023-12-05 PROCEDURE — 3600007512: Performed by: INTERNAL MEDICINE

## 2023-12-05 PROCEDURE — 88305 TISSUE EXAM BY PATHOLOGIST: CPT

## 2023-12-05 PROCEDURE — 3700000001 HC ADD 15 MINUTES (ANESTHESIA): Performed by: INTERNAL MEDICINE

## 2023-12-05 PROCEDURE — 3600007502: Performed by: INTERNAL MEDICINE

## 2023-12-05 PROCEDURE — 7100000011 HC PHASE II RECOVERY - ADDTL 15 MIN: Performed by: INTERNAL MEDICINE

## 2023-12-05 PROCEDURE — 2580000003 HC RX 258: Performed by: INTERNAL MEDICINE

## 2023-12-05 PROCEDURE — 6360000002 HC RX W HCPCS: Performed by: NURSE ANESTHETIST, CERTIFIED REGISTERED

## 2023-12-05 PROCEDURE — 3700000000 HC ANESTHESIA ATTENDED CARE: Performed by: INTERNAL MEDICINE

## 2023-12-05 PROCEDURE — 2500000003 HC RX 250 WO HCPCS: Performed by: NURSE ANESTHETIST, CERTIFIED REGISTERED

## 2023-12-05 RX ORDER — SODIUM CHLORIDE 0.9 % (FLUSH) 0.9 %
5-40 SYRINGE (ML) INJECTION PRN
Status: DISCONTINUED | OUTPATIENT
Start: 2023-12-05 | End: 2023-12-05 | Stop reason: HOSPADM

## 2023-12-05 RX ORDER — LIDOCAINE HYDROCHLORIDE 20 MG/ML
INJECTION, SOLUTION EPIDURAL; INFILTRATION; INTRACAUDAL; PERINEURAL PRN
Status: DISCONTINUED | OUTPATIENT
Start: 2023-12-05 | End: 2023-12-05 | Stop reason: SDUPTHER

## 2023-12-05 RX ORDER — DEXMEDETOMIDINE HYDROCHLORIDE 100 UG/ML
INJECTION, SOLUTION INTRAVENOUS PRN
Status: DISCONTINUED | OUTPATIENT
Start: 2023-12-05 | End: 2023-12-05 | Stop reason: SDUPTHER

## 2023-12-05 RX ORDER — SODIUM CHLORIDE 0.9 % (FLUSH) 0.9 %
5-40 SYRINGE (ML) INJECTION EVERY 12 HOURS SCHEDULED
Status: DISCONTINUED | OUTPATIENT
Start: 2023-12-05 | End: 2023-12-05 | Stop reason: HOSPADM

## 2023-12-05 RX ORDER — PROPOFOL 10 MG/ML
INJECTION, EMULSION INTRAVENOUS CONTINUOUS PRN
Status: DISCONTINUED | OUTPATIENT
Start: 2023-12-05 | End: 2023-12-05 | Stop reason: SDUPTHER

## 2023-12-05 RX ORDER — SODIUM CHLORIDE 9 MG/ML
25 INJECTION, SOLUTION INTRAVENOUS PRN
Status: DISCONTINUED | OUTPATIENT
Start: 2023-12-05 | End: 2023-12-05 | Stop reason: HOSPADM

## 2023-12-05 RX ADMIN — PROPOFOL 35 MG: 10 INJECTION, EMULSION INTRAVENOUS at 09:30

## 2023-12-05 RX ADMIN — PROPOFOL 30 MG: 10 INJECTION, EMULSION INTRAVENOUS at 09:43

## 2023-12-05 RX ADMIN — SODIUM CHLORIDE, PRESERVATIVE FREE 250 ML: 5 INJECTION INTRAVENOUS at 09:46

## 2023-12-05 RX ADMIN — DEXMEDETOMIDINE 6 MCG: 100 INJECTION, SOLUTION INTRAVENOUS at 09:18

## 2023-12-05 RX ADMIN — LIDOCAINE HYDROCHLORIDE 30 MG: 20 INJECTION, SOLUTION EPIDURAL; INFILTRATION; INTRACAUDAL; PERINEURAL at 09:17

## 2023-12-05 RX ADMIN — PROPOFOL 30 MG: 10 INJECTION, EMULSION INTRAVENOUS at 09:36

## 2023-12-05 RX ADMIN — PROPOFOL 150 MCG/KG/MIN: 10 INJECTION, EMULSION INTRAVENOUS at 09:17

## 2023-12-05 RX ADMIN — PROPOFOL 30 MG: 10 INJECTION, EMULSION INTRAVENOUS at 09:26

## 2023-12-05 RX ADMIN — PROPOFOL 70 MG: 10 INJECTION, EMULSION INTRAVENOUS at 09:22

## 2023-12-05 RX ADMIN — PROPOFOL 50 MG: 10 INJECTION, EMULSION INTRAVENOUS at 09:40

## 2023-12-05 ASSESSMENT — PAIN DESCRIPTION - DESCRIPTORS: DESCRIPTORS: ACHING

## 2023-12-05 ASSESSMENT — PAIN SCALES - GENERAL
PAINLEVEL_OUTOF10: 0
PAINLEVEL_OUTOF10: 0
PAINLEVEL_OUTOF10: 9

## 2023-12-05 ASSESSMENT — PAIN - FUNCTIONAL ASSESSMENT: PAIN_FUNCTIONAL_ASSESSMENT: 0-10

## 2023-12-05 ASSESSMENT — PAIN DESCRIPTION - LOCATION: LOCATION: HIP

## 2023-12-05 NOTE — PROCEDURES
Vicky Jimenez M.D.  [phone]            2023          Colonoscopy Operative Report  Alyse Figueroa  :  1960  [facility] Medical Record Number:  [id number]      Indications:    Personal history of colon polyps (screening only)     :  Will Cano MD    Assistant -- None  Implants -- None    Referring Provider: Hilary Adams MD    Sedation:  MAC anesthesia Propofol    Pre-Procedural Exam:      Airway: clear,  No airway problems anticipated  Heart: RRR, without gallops or rubs  Lungs: clear bilaterally without wheezes, crackles, or rhonchi  Abdomen: soft, nontender, nondistended, bowel sounds present  Mental Status: awake, alert and oriented to person, place and time     Procedure Details:  After informed consent was obtained with all risks and benefits of procedure explained and preoperative exam completed, the patient was taken to the endoscopy suite and placed in the left lateral decubitus position. Upon sequential sedation as per above, a digital rectal exam was performed. The Olympus videocolonoscope  was inserted in the rectum and carefully advanced to the cecum, which was identified by the ileocecal valve and appendiceal orifice. The quality of preparation was good. The colonoscope was slowly withdrawn with careful inspection and evaluation between folds. Retroflexion in the rectum was performed. Findings:   Terminal Ileum: not intubated  Cecum: normal  Ascending Colon: normal  Transverse Colon: 1  Sessile polyp(s), the largest 6 mm in size; Descending Colon: normal  Sigmoid: normal  Rectum: normal    Interventions:  1 complete polypectomy were performed using cold snare  and the polyps were  retrieved    Specimen Removed:  specimen #1, 6 mm in size, located in the transverse colon removed by cold snare and retrieved for pathology    Complications: None. EBL:  None.     Impression:  1 polyp removed as above

## 2023-12-05 NOTE — H&P
Richa Kirby M.D.  [phone]            History and Physical       NAME:  Velasquez Ovalle   :   1960   MRN:   [id number]       Referring Physician:  Jeff Rushing MD      Consult Date: 2023 9:15 AM    Chief Complaint:  Colon cancer screening    History of Present Illness:  Patient is a 61 y.o. who is seen for colon cancer screening. Denies any ongoing GI complaints. PMH:  Past Medical History:   Diagnosis Date    Acid reflux     Arthritis     Chronic pain     all over    Colon polyp     Depression     Heart abnormality     bubble test showed a small amount of unfiltered blood went from one side of the heart to the filtered side/flap not grown over as a infant    Herniated cervical disc     High cholesterol     History of blood transfusion     transfusion and other blood - no reaction    Hypertension     Knee dislocation     left - multiple dislocations/carlitos fluid off    Lumbar herniated disc     Lung abnormality     spot - PET scan per pt normal/monitoring only    Sleep disorder     Stroke ([address] ) 2010    ministroke       PSH:  Past Surgical History:   Procedure Laterality Date    COLONOSCOPY N/A 2020    DENTAL SURGERY      pulled tooth - molar    ENDOSCOPY, COLON, DIAGNOSTIC      \"3-4 times\" - dilatation    HERNIA REPAIR      two surgeries but one was a double hernia    JOINT REPLACEMENT Right     right hip    ORTHOPEDIC SURGERY Right     femur surgery d/t and another surgery d/t this femur being crooked per pt    ORTHOPEDIC SURGERY Bilateral     knee surgery - arthroscopy    ORTHOPEDIC SURGERY Right     knee replacement surgery    ORTHOPEDIC SURGERY Bilateral     wrist surgery - R wrist has hardware/Left wrist - fusion    ORTHOPEDIC SURGERY Left     hip surgery d/t dislocation    PLANTAR'S WART EXCISION Right     one surgery with 2 plantar warts       Allergies:   Allergies   Allergen Reactions    Nsaids Other (See Comments)     \"Gives me high

## 2023-12-05 NOTE — ANESTHESIA POSTPROCEDURE EVALUATION
Department of Anesthesiology  Postprocedure Note    Patient: Alyse Figueroa  MRN: [id number]  YOB: 1960  Date of evaluation: 12/5/2023      Procedure Summary       Date: 12/05/23 Room / Location: [facility] ENDO 02 / [facility] ENDOSCOPY    Anesthesia Start: 0915 Anesthesia Stop: 6302    Procedures:       COLONOSCOPY (Lower GI Region)      COLONOSCOPY POLYPECTOMY SNARE/COLD BIOPSY (Lower GI Region) Diagnosis:       Polyp of ascending colon, unspecified type      (Polyp of ascending colon, unspecified type [K63.5])    Surgeons: Will Cano MD Responsible Provider: Javan Shrestha DO    Anesthesia Type: MAC ASA Status: 3            Anesthesia Type: MAC    Anastacio Phase I: Anastacio Score: 10    Anastacio Phase II: Anastacio Score: 10      Anesthesia Post Evaluation    Patient location during evaluation: PACU  Patient participation: complete - patient participated  Level of consciousness: awake and sleepy but conscious  Airway patency: patent  Nausea & Vomiting: no vomiting and no nausea  Complications: no  Cardiovascular status: hemodynamically stable  Respiratory status: acceptable  Hydration status: stable  Comments: Patient seen and examined. Ready for discharge from PACU.     Pain management: adequate

## 2023-12-05 NOTE — DISCHARGE INSTRUCTIONS
2023    Marleny Parsons  :  1960  [facility] Medical Record Number:  [id number]      COLONOSCOPY FINDINGS:  Your colonoscopy showed: 1 polyp removed and diverticulosis noted. DISCOMFORT:  Redness at IV site- apply warm compress to area; if redness or soreness persist- contact your physician  There may be a slight amount of blood passed from the rectum  Gaseous discomfort- walking, belching will help relieve any discomfort  You may not operate a vehicle for 12 hours  You may not engage in an occupation involving machinery or appliances for rest of today  You may not drink alcoholic beverages for at least 12 hours  Avoid making any critical decisions for at least 24 hour  DIET:   regular   - however -  remember your colon is empty and a heavy meal will produce gas. Avoid these foods:  vegetables, fried / greasy foods, carbonated drinks for today     ACTIVITY:  You may resume your normal daily activities it is recommended that you spend the remainder of the day resting -  avoid any strenuous activity. CALL M.D. ANY SIGN OF:   Increasing pain, nausea, vomiting  Abdominal distension (swelling)  New increased bleeding (oral or rectal)  Fever (chills)  Pain in chest area  Bloody discharge from nose or mouth   Shortness of breath    Follow-up Instructions:   Call Dr. Clare Gaucher if any questions or problems. Telephone # [phone]  Biopsy results will be available in  5 to 7 days  Should have a repeat colonoscopy in 7 years.

## 2023-12-05 NOTE — PERIOP NOTE
Nursing report given to LEODAN LIVINGSTON RN. Patient tolerated procedure without problems. Abdomen soft and patient arousable and voices no complaints Report received from  Shaniqua Cruz CRNA, see anesthesia note. Patient transported to endoscopy recovery area. Scope precleaned by Quan Espino at bedside.

## 2023-12-05 NOTE — PROGRESS NOTES
Endoscopy discharge instructions have been reviewed and given to patient. The patient verbalized understanding and acceptance of instructions. Dr. Elizabeth Parsons  discussed with patient procedure findings and next steps.

## 2023-12-05 NOTE — PROGRESS NOTES
Helio Colbert  1960  [id number]    Situation:  Verbal report received from: Jed Naayk RN   Procedure: Procedure(s):  COLONOSCOPY  COLONOSCOPY POLYPECTOMY SNARE/COLD BIOPSY    Background:    Preoperative diagnosis: Polyp of ascending colon, unspecified type [K63.5]  Postoperative diagnosis: * No post-op diagnosis entered *    :  Dr. Sosa De La Cruz   Assistant(s): Circulator: Nila Macias RN  Endoscopy Technician: Evelia Haney    Specimens: @[facility]@  H. Pylori    no    Assessment:  Intra-procedure medications     Anesthesia gave intra-procedure sedation and medications, see anesthesia flow sheet   yes    Intravenous fluids: NS@ [facility]     Vital signs stable   yes    Abdominal assessment: round and soft   yes    Recommendation:  Discharge patient per MD order  yes.   Return to floor  outpatient  Family or Friend   spouse  Permission to share finding with family or friend   yes

## 2024-10-10 ENCOUNTER — HOSPITAL ENCOUNTER (OUTPATIENT)
Facility: HOSPITAL | Age: 64
Discharge: HOME | End: 2024-10-13
Payer: MEDICARE

## 2024-10-10 DIAGNOSIS — M50.20 OTHER CERVICAL DISC DISPLACEMENT, UNSPECIFIED CERVICAL REGION: ICD-10-CM

## 2024-10-10 PROCEDURE — 72141 MRI NECK SPINE W/O DYE: CPT

## 2025-07-04 ENCOUNTER — HOSPITAL ENCOUNTER (EMERGENCY)
Facility: HOSPITAL | Age: 65
Discharge: HOME | End: 2025-07-04
Attending: EMERGENCY MEDICINE
Payer: MEDICARE

## 2025-07-04 ENCOUNTER — APPOINTMENT (OUTPATIENT)
Facility: HOSPITAL | Age: 65
End: 2025-07-04
Payer: MEDICARE

## 2025-07-04 VITALS
HEIGHT: 69 IN | BODY MASS INDEX: 31.03 KG/M2 | SYSTOLIC BLOOD PRESSURE: 132 MMHG | HEART RATE: 91 BPM | DIASTOLIC BLOOD PRESSURE: 84 MMHG | RESPIRATION RATE: 12 BRPM | TEMPERATURE: 98.4 F | OXYGEN SATURATION: 93 %

## 2025-07-04 DIAGNOSIS — R14.0 ABDOMINAL DISTENSION: Primary | ICD-10-CM

## 2025-07-04 DIAGNOSIS — K52.9 INFLAMMATION OF INTESTINE: ICD-10-CM

## 2025-07-04 LAB
ALBUMIN SERPL-MCNC: 3.5 G/DL (ref 3.5–5)
ALBUMIN/GLOB SERPL: 0.7 (ref 1.1–2.2)
ALP SERPL-CCNC: 89 U/L (ref 45–117)
ALT SERPL-CCNC: 23 U/L (ref 12–78)
ANION GAP SERPL CALC-SCNC: 7 MMOL/L (ref 2–12)
AST SERPL-CCNC: 20 U/L (ref 15–37)
BASOPHILS # BLD: 0.04 K/UL (ref 0–0.1)
BASOPHILS NFR BLD: 0.4 % (ref 0–1)
BILIRUB SERPL-MCNC: 0.4 MG/DL (ref 0.2–1)
BUN SERPL-MCNC: 14 MG/DL (ref 6–20)
BUN/CREAT SERPL: 9 (ref 12–20)
CALCIUM SERPL-MCNC: 9.4 MG/DL (ref 8.5–10.1)
CHLORIDE SERPL-SCNC: 111 MMOL/L (ref 97–108)
CO2 SERPL-SCNC: 26 MMOL/L (ref 21–32)
COMMENT:: NORMAL
CREAT SERPL-MCNC: 1.51 MG/DL (ref 0.7–1.3)
DIFFERENTIAL METHOD BLD: NORMAL
EOSINOPHIL # BLD: 0.12 K/UL (ref 0–0.4)
EOSINOPHIL NFR BLD: 1.1 % (ref 0–7)
ERYTHROCYTE [DISTWIDTH] IN BLOOD BY AUTOMATED COUNT: 13.2 % (ref 11.5–14.5)
GLOBULIN SER CALC-MCNC: 4.7 G/DL (ref 2–4)
GLUCOSE SERPL-MCNC: 146 MG/DL (ref 65–100)
HCT VFR BLD AUTO: 47.1 % (ref 36.6–50.3)
HGB BLD-MCNC: 16 G/DL (ref 12.1–17)
IMM GRANULOCYTES # BLD AUTO: 0.03 K/UL (ref 0–0.04)
IMM GRANULOCYTES NFR BLD AUTO: 0.3 % (ref 0–0.5)
LIPASE SERPL-CCNC: 37 U/L (ref 13–75)
LYMPHOCYTES # BLD: 2.13 K/UL (ref 0.8–3.5)
LYMPHOCYTES NFR BLD: 19.5 % (ref 12–49)
MCH RBC QN AUTO: 31.5 PG (ref 26–34)
MCHC RBC AUTO-ENTMCNC: 34 G/DL (ref 30–36.5)
MCV RBC AUTO: 92.7 FL (ref 80–99)
MONOCYTES # BLD: 0.66 K/UL (ref 0–1)
MONOCYTES NFR BLD: 6 % (ref 5–13)
NEUTS SEG # BLD: 7.93 K/UL (ref 1.8–8)
NEUTS SEG NFR BLD: 72.7 % (ref 32–75)
NRBC # BLD: 0 K/UL (ref 0–0.01)
NRBC BLD-RTO: 0 PER 100 WBC
PLATELET # BLD AUTO: 263 K/UL (ref 150–400)
PMV BLD AUTO: 11.5 FL (ref 8.9–12.9)
POTASSIUM SERPL-SCNC: 4.2 MMOL/L (ref 3.5–5.1)
PROT SERPL-MCNC: 8.2 G/DL (ref 6.4–8.2)
RBC # BLD AUTO: 5.08 M/UL (ref 4.1–5.7)
SODIUM SERPL-SCNC: 144 MMOL/L (ref 136–145)
SPECIMEN HOLD: NORMAL
WBC # BLD AUTO: 10.9 K/UL (ref 4.1–11.1)

## 2025-07-04 PROCEDURE — 74177 CT ABD & PELVIS W/CONTRAST: CPT

## 2025-07-04 PROCEDURE — 6360000004 HC RX CONTRAST MEDICATION: Performed by: EMERGENCY MEDICINE

## 2025-07-04 PROCEDURE — 99285 EMERGENCY DEPT VISIT HI MDM: CPT

## 2025-07-04 PROCEDURE — 80053 COMPREHEN METABOLIC PANEL: CPT

## 2025-07-04 PROCEDURE — 83690 ASSAY OF LIPASE: CPT

## 2025-07-04 PROCEDURE — 94761 N-INVAS EAR/PLS OXIMETRY MLT: CPT

## 2025-07-04 PROCEDURE — 85025 COMPLETE CBC W/AUTO DIFF WBC: CPT

## 2025-07-04 RX ORDER — IOPAMIDOL 755 MG/ML
100 INJECTION, SOLUTION INTRAVASCULAR
Status: COMPLETED | OUTPATIENT
Start: 2025-07-04 | End: 2025-07-04

## 2025-07-04 RX ADMIN — IOPAMIDOL 100 ML: 755 INJECTION, SOLUTION INTRAVENOUS at 20:57

## 2025-07-04 ASSESSMENT — PAIN DESCRIPTION - DESCRIPTORS: DESCRIPTORS: SHARP

## 2025-07-04 ASSESSMENT — PAIN DESCRIPTION - PAIN TYPE: TYPE: ACUTE PAIN

## 2025-07-04 ASSESSMENT — PAIN - FUNCTIONAL ASSESSMENT: PAIN_FUNCTIONAL_ASSESSMENT: 0-10

## 2025-07-04 ASSESSMENT — PAIN SCALES - GENERAL: PAINLEVEL_OUTOF10: 5

## 2025-07-04 ASSESSMENT — PAIN DESCRIPTION - LOCATION: LOCATION: ABDOMEN

## 2025-07-04 ASSESSMENT — PAIN DESCRIPTION - ORIENTATION: ORIENTATION: RIGHT

## 2025-07-04 NOTE — ED TRIAGE NOTES
Patient is a 65 year old male complaining of abdominal pain with distension. Patient states his abdomen has been getting larger over the last couples months. Patient states his abdomen is painful. Patient reports some nausea. Patient reports pain especially in the RUQ when moving. Patient denies chest pain, shortness of breath, back pain. Patient alert and oriented x4, in NAD.

## 2025-07-04 NOTE — ED PROVIDER NOTES
[facility] EMERGENCY DEPARTMENT  EMERGENCY DEPARTMENT ENCOUNTER      Pt Name: Lupillo Fajardo  MRN: [id number]  Birthdate 1960  Date of evaluation: 7/4/2025  Provider: Jayjay Carranza PA-C    CHIEF COMPLAINT       Chief Complaint   Patient presents with    Abdominal Pain         HISTORY OF PRESENT ILLNESS   (Location/Symptom, Timing/Onset, Context/Setting, Quality, Duration, Modifying Factors, Severity)  Note limiting factors.   Abdomen feels \"hard as a brick\" x 3 months. It appears to be getting worse. He has some abdominal pain if his cat walks on his stomach or if he bends over. Last BM was this morning. He has had some nausea.    His stool has been orange.     BP elevated ()    Prior umbilical hernia repair.     Denies dark/bloody stools, vomiting            Review of External Medical Records:     Nursing Notes were reviewed.    REVIEW OF SYSTEMS    (2-9 systems for level 4, 10 or more for level 5)     Review of Systems    Except as noted above the remainder of the review of systems was reviewed and negative.       PAST MEDICAL HISTORY     Past Medical History:   Diagnosis Date    Acid reflux     Arthritis     Chronic pain     all over    Colon polyp     Depression     Heart abnormality     bubble test showed a small amount of unfiltered blood went from one side of the heart to the filtered side/flap not grown over as a infant    Herniated cervical disc     High cholesterol     History of blood transfusion     transfusion and other blood - no reaction    Hypertension     Knee dislocation     left - multiple dislocations/carlitos fluid off    Lumbar herniated disc     Lung abnormality     spot - PET scan per pt normal/monitoring only    Sleep disorder     Stroke ([facility]) 2010    ministroke         SURGICAL HISTORY       Past Surgical History:   Procedure Laterality Date    COLONOSCOPY N/A 9/29/2020    COLONOSCOPY N/A 12/5/2023    COLONOSCOPY performed by Oliverio Valdez MD at [facility]

## 2025-07-05 NOTE — DISCHARGE INSTRUCTIONS
Follow-up with your family doctor and also a gastroenterologist.  You may follow-up with [facility] or a gastroenterologist of your choice.

## (undated) DEVICE — BAG SPEC BIOHZRD 10 X 10 IN --

## (undated) DEVICE — BAG BELONG PT PERS CLEAR HANDL

## (undated) DEVICE — 3M™ CUROS™ DISINFECTING CAP FOR NEEDLELESS CONNECTORS 270/CARTON 20 CARTONS/CASE CFF1-270: Brand: CUROS™

## (undated) DEVICE — FORCEPS BX L240CM JAW DIA2.8MM L CAP W/ NDL MIC MESH TOOTH

## (undated) DEVICE — CATH IV AUTOGRD BC BLU 22GA 25 -- INSYTE

## (undated) DEVICE — BITEBLOCK ENDOSCP 60FR MAXI WHT POLYETH STURDY W/ VELC WVN

## (undated) DEVICE — NEEDLE SCLERO 23GA L4MM CATH L240CM CNTRST SHTH DIA1.8MM

## (undated) DEVICE — ADULT SPO2 SENSOR: Brand: NELLCOR

## (undated) DEVICE — CUFF ADULT 1 PC 1 VINYL DISP --

## (undated) DEVICE — NDL PRT INJ NSAF BLNT 18GX1.5 --

## (undated) DEVICE — SET ADMIN 16ML TBNG L100IN 2 Y INJ SITE IV PIGGY BK DISP

## (undated) DEVICE — SOLIDIFIER MEDC 1200ML -- CONVERT TO 356117

## (undated) DEVICE — CONTAINER SPEC 20 ML LID NEUT BUFF FORMALIN 10 % POLYPR STS

## (undated) DEVICE — Device

## (undated) DEVICE — ELECTRODE,RADIOTRANSLUCENT,FOAM,3PK: Brand: MEDLINE

## (undated) DEVICE — KIT COLON W/ 1.1OZ LUB AND 2 END

## (undated) DEVICE — BASIN EMSIS 16OZ GRAPHITE PLAS KID SHP MOLD GRAD FOR ORAL

## (undated) DEVICE — SYR 10ML LUER LOK 1/5ML GRAD --

## (undated) DEVICE — 1200 GUARD II KIT W/5MM TUBE W/O VAC TUBE: Brand: GUARDIAN

## (undated) DEVICE — CANNULA CUSH AD W/ 14FT TBG